# Patient Record
Sex: MALE | Race: WHITE | NOT HISPANIC OR LATINO | URBAN - METROPOLITAN AREA
[De-identification: names, ages, dates, MRNs, and addresses within clinical notes are randomized per-mention and may not be internally consistent; named-entity substitution may affect disease eponyms.]

---

## 2021-01-13 ENCOUNTER — HOSPITAL ENCOUNTER (OUTPATIENT)
Dept: OTHER | Facility: HOSPITAL | Age: 76
Discharge: HOME | End: 2021-01-13
Attending: INTERNAL MEDICINE

## 2021-02-08 ENCOUNTER — HOSPITAL ENCOUNTER (OUTPATIENT)
Dept: VACCINE CLINIC | Facility: HOSPITAL | Age: 76
Discharge: HOME | End: 2021-02-08
Attending: INTERNAL MEDICINE

## 2024-09-07 ENCOUNTER — HOSPITAL ENCOUNTER (EMERGENCY)
Facility: HOSPITAL | Age: 79
Discharge: HOME | End: 2024-09-07
Attending: EMERGENCY MEDICINE
Payer: MEDICARE

## 2024-09-07 VITALS
RESPIRATION RATE: 18 BRPM | HEIGHT: 70 IN | HEART RATE: 57 BPM | TEMPERATURE: 98.3 F | OXYGEN SATURATION: 100 % | SYSTOLIC BLOOD PRESSURE: 192 MMHG | BODY MASS INDEX: 24.34 KG/M2 | WEIGHT: 170 LBS | DIASTOLIC BLOOD PRESSURE: 87 MMHG

## 2024-09-07 DIAGNOSIS — K08.89 PAIN, DENTAL: Primary | ICD-10-CM

## 2024-09-07 PROCEDURE — 99283 EMERGENCY DEPT VISIT LOW MDM: CPT

## 2024-09-07 RX ORDER — LANCETS
EACH MISCELLANEOUS
COMMUNITY
Start: 2024-08-19

## 2024-09-07 RX ORDER — CLONIDINE HYDROCHLORIDE 0.2 MG/1
1 TABLET ORAL 2 TIMES DAILY
COMMUNITY
Start: 2024-04-23

## 2024-09-07 RX ORDER — ALLOPURINOL 300 MG/1
TABLET ORAL
COMMUNITY
Start: 2024-04-23

## 2024-09-07 RX ORDER — BLOOD SUGAR DIAGNOSTIC
1 STRIP MISCELLANEOUS DAILY
COMMUNITY
Start: 2024-08-28

## 2024-09-07 RX ORDER — LISINOPRIL 10 MG/1
10 TABLET ORAL DAILY
COMMUNITY
Start: 2024-04-30

## 2024-09-07 RX ORDER — CETIRIZINE HYDROCHLORIDE 10 MG/1
10 TABLET ORAL DAILY
COMMUNITY
Start: 2024-04-26

## 2024-09-07 RX ORDER — ATENOLOL 100 MG/1
100 TABLET ORAL DAILY
COMMUNITY
Start: 2024-08-29 | End: 2025-02-25

## 2024-09-07 RX ORDER — CHLORHEXIDINE GLUCONATE ORAL RINSE 1.2 MG/ML
15 SOLUTION DENTAL 4 TIMES DAILY
Qty: 473 ML | Refills: 0 | Status: SHIPPED | OUTPATIENT
Start: 2024-09-07

## 2024-09-07 RX ADMIN — BENZOCAINE: 200 GEL DENTAL; ORAL; PERIODONTAL at 11:06

## 2024-09-07 NOTE — ED PROVIDER NOTES
"Time: 10:33 AM EDT  Date of encounter:  9/7/2024  Independent Historian/Clinical History and Information was obtained by:   Patient    History is limited by: N/A    Chief Complaint: Dental pain      History of Present Illness:  Patient is a 78 y.o. year old male who presents to the emergency department for evaluation of dental pain for 2 to 3 days.  Patient states he tried to get in with his doctor and his dentist but they are all booked up.  He is taken Tylenol and Advil with some relief.  However last night pain kept him awake.  Denies fever/chills, swelling, drooling, difficulty swallowing.  States in the past his dentist gave him an oral mouthwash that seemed to help a lot.      Patient Care Team  Primary Care Provider: Joaquín Paulino MD    Past Medical History:     No Known Allergies  Past Medical History:   Diagnosis Date    Diabetes mellitus     Gout     Hypertension      History reviewed. No pertinent surgical history.  History reviewed. No pertinent family history.    Home Medications:  Prior to Admission medications    Not on File        Social History:   Social History     Tobacco Use    Smoking status: Never     Passive exposure: Never    Smokeless tobacco: Never   Substance Use Topics    Alcohol use: Never    Drug use: Never         Review of Systems:  Review of Systems   HENT:  Positive for dental problem.    All other systems reviewed and are negative.       Physical Exam:  BP (!) 190/71 (BP Location: Left arm, Patient Position: Sitting)   Pulse 60   Temp 97.6 °F (36.4 °C) (Oral)   Resp 18   Ht 177.8 cm (70\")   Wt 77.1 kg (170 lb)   SpO2 98%   BMI 24.39 kg/m²     Physical Exam  Vitals and nursing note reviewed.   Constitutional:       General: He is not in acute distress.     Appearance: Normal appearance. He is not ill-appearing or toxic-appearing.   HENT:      Head: Normocephalic and atraumatic.      Mouth/Throat:      Lips: Pink.      Mouth: Mucous membranes are moist.      Dentition: " Dental tenderness and dental caries present. No dental abscesses.        Comments: No facial swelling. No appreciable adjacent abscess. No submandibular brawny edema. No sublingual induration or pus on floor of mouth.  Eyes:      General: No scleral icterus.  Cardiovascular:      Rate and Rhythm: Normal rate and regular rhythm.      Pulses:           Radial pulses are 2+ on the right side and 2+ on the left side.      Heart sounds: Normal heart sounds.   Pulmonary:      Effort: Pulmonary effort is normal. No respiratory distress.      Breath sounds: Normal breath sounds. No wheezing or rhonchi.   Abdominal:      General: Abdomen is flat.      Palpations: Abdomen is soft.      Tenderness: There is no abdominal tenderness.   Musculoskeletal:         General: Normal range of motion.      Cervical back: Normal range of motion and neck supple.   Skin:     General: Skin is warm and dry.   Neurological:      Mental Status: He is alert and oriented to person, place, and time. Mental status is at baseline.                Procedures:  Procedures      Medical Decision Making:      Comorbidities that affect care:    Hypertension    External Notes reviewed:          The following orders were placed and all results were independently analyzed by me:  No orders of the defined types were placed in this encounter.      Medications Given in the Emergency Department:  Medications   benzocaine (HURRICAINE/ORAJEL) 20 % jelly ( Mouth/Throat Given 9/7/24 1106)        ED Course:         Labs:    Lab Results (last 24 hours)       ** No results found for the last 24 hours. **             Imaging:    No Radiology Exams Resulted Within Past 24 Hours      Differential Diagnosis and Discussion:    Dental Pain: Differential diagnosis includes but is not limited to dental caries, periodontitis, pericoronitis, peridental abscess, gingival abscess, apthous stomatitis, allergic stomatitis, acute necrotizing ulcerative gingivitis, herpetic  stomatitis.        MDM  Number of Diagnoses or Management Options  Pain, dental  Diagnosis management comments: No evidence of dental abscess.  I do not believe that the patient has an acute emergency medical condition requiring additional emergency management at this time. The patient is currently stable for outpatient treatment and continuation of care. Important signs and symptoms that would warrant return to the emergency department were reviewed. The patient was provided the opportunity to ask questions. All questions were addressed and the patient was discharged from the ED. The patient demonstrated understanding and agreed to plan      Risk of Complications, Morbidity, and/or Mortality  Presenting problems: minimal  Diagnostic procedures: minimal  Management options: minimal    Patient Progress  Patient progress: stable                       Patient Care Considerations:    NARCOTICS: I considered prescribing opiate pain medication as an outpatient, however contraindicated in this age group      Consultants/Shared Management Plan:        Social Determinants of Health:    Patient is independent, reliable, and has access to care.       Disposition and Care Coordination:    Discharged: The patient is suitable and stable for discharge with no need for consideration of admission.    I have explained discharge medications and the need for follow up with the patient/caretakers. This was also printed in the discharge instructions. Patient was discharged with the following medications and follow up:      Medication List        New Prescriptions      chlorhexidine 0.12 % solution  Commonly known as: PERIDEX  Apply 15 mL to the mouth or throat 4 (Four) Times a Day.               Where to Get Your Medications        These medications were sent to [id number] - [place] - [address] AT [facility] OF [address] & [place] - [phone] [facility] [phone]   [address] [place] [address]       Phone: [phone]   chlorhexidine 0.12 % solution      Joaquín Paulino MD  [address] [place] [address]  [phone]    Call   As needed       Final diagnoses:   Pain, dental        ED Disposition       ED Disposition   Discharge    Condition   Stable    Comment   --               This medical record created using voice recognition software.             Khadijah Patel, APRN  09/07/24 1112

## 2024-09-07 NOTE — DISCHARGE INSTRUCTIONS
Please schedule an appointment with your dentist as soon as possible.    If you cannot get in with your dentist you could also try you can try to follow up with Dr. Hooker at [facility] [place], Phone [phone].    Return for worsening symptoms or new concerns.